# Patient Record
Sex: MALE | Race: WHITE | NOT HISPANIC OR LATINO | Employment: FULL TIME | URBAN - METROPOLITAN AREA
[De-identification: names, ages, dates, MRNs, and addresses within clinical notes are randomized per-mention and may not be internally consistent; named-entity substitution may affect disease eponyms.]

---

## 2024-06-12 ENCOUNTER — TELEPHONE (OUTPATIENT)
Dept: FAMILY MEDICINE | Facility: CLINIC | Age: 54
End: 2024-06-12
Payer: COMMERCIAL

## 2024-06-12 NOTE — TELEPHONE ENCOUNTER
----- Message from Larissa Guzman sent at 6/12/2024  9:56 AM CDT -----  Regarding: appt  Type:  Sooner Apoointment Request      Name of Caller:pt    When is the first available appointment?dept booked     Symptoms:annual/ est care      Best Call Back Number:[phone]      Additional Information: pt is looking to get schedule.  His wife is a pt in would like to under the same care. please call to discuss.

## 2024-06-19 ENCOUNTER — OFFICE VISIT (OUTPATIENT)
Dept: FAMILY MEDICINE | Facility: CLINIC | Age: 54
End: 2024-06-19
Payer: COMMERCIAL

## 2024-06-19 ENCOUNTER — PATIENT MESSAGE (OUTPATIENT)
Dept: GASTROENTEROLOGY | Facility: CLINIC | Age: 54
End: 2024-06-19
Payer: COMMERCIAL

## 2024-06-19 VITALS
BODY MASS INDEX: 33.01 KG/M2 | WEIGHT: 198.13 LBS | DIASTOLIC BLOOD PRESSURE: 82 MMHG | OXYGEN SATURATION: 97 % | HEIGHT: 65 IN | HEART RATE: 78 BPM | SYSTOLIC BLOOD PRESSURE: 135 MMHG

## 2024-06-19 DIAGNOSIS — Z12.5 SPECIAL SCREENING FOR MALIGNANT NEOPLASM OF PROSTATE: ICD-10-CM

## 2024-06-19 DIAGNOSIS — Z11.3 SCREEN FOR STD (SEXUALLY TRANSMITTED DISEASE): ICD-10-CM

## 2024-06-19 DIAGNOSIS — Z13.1 DIABETES MELLITUS SCREENING: ICD-10-CM

## 2024-06-19 DIAGNOSIS — R10.30 LOWER ABDOMINAL PAIN: ICD-10-CM

## 2024-06-19 DIAGNOSIS — Z00.00 PREVENTATIVE HEALTH CARE: ICD-10-CM

## 2024-06-19 DIAGNOSIS — Z13.220 ENCOUNTER FOR SCREENING FOR LIPID DISORDER: ICD-10-CM

## 2024-06-19 DIAGNOSIS — Z12.12 SCREENING FOR MALIGNANT NEOPLASM OF THE RECTUM: ICD-10-CM

## 2024-06-19 DIAGNOSIS — Z11.59 ENCOUNTER FOR HEPATITIS C SCREENING TEST FOR LOW RISK PATIENT: ICD-10-CM

## 2024-06-19 DIAGNOSIS — N52.01 ERECTILE DYSFUNCTION DUE TO ARTERIAL INSUFFICIENCY: ICD-10-CM

## 2024-06-19 DIAGNOSIS — I10 ESSENTIAL HYPERTENSION, BENIGN: Primary | ICD-10-CM

## 2024-06-19 PROCEDURE — 3075F SYST BP GE 130 - 139MM HG: CPT | Mod: CPTII,S$GLB,, | Performed by: INTERNAL MEDICINE

## 2024-06-19 PROCEDURE — 1160F RVW MEDS BY RX/DR IN RCRD: CPT | Mod: CPTII,S$GLB,, | Performed by: INTERNAL MEDICINE

## 2024-06-19 PROCEDURE — 3079F DIAST BP 80-89 MM HG: CPT | Mod: CPTII,S$GLB,, | Performed by: INTERNAL MEDICINE

## 2024-06-19 PROCEDURE — 1159F MED LIST DOCD IN RCRD: CPT | Mod: CPTII,S$GLB,, | Performed by: INTERNAL MEDICINE

## 2024-06-19 PROCEDURE — 99204 OFFICE O/P NEW MOD 45 MIN: CPT | Mod: S$GLB,,, | Performed by: INTERNAL MEDICINE

## 2024-06-19 PROCEDURE — 3008F BODY MASS INDEX DOCD: CPT | Mod: CPTII,S$GLB,, | Performed by: INTERNAL MEDICINE

## 2024-06-19 NOTE — PROGRESS NOTES
Subjective:       Patient ID: Shaun Peetrson is a 53 y.o. male.    Chief Complaint: Abdominal Pain (Patient is here for abdomin pain and having trouble urinating. )      /is a new patient who presents today to establish care and for management of the chronic problems  and preventive medicine      Abdominal Pain  Pertinent negatives include no fever. His past medical history is significant for abdominal surgery.   Abdominal Cramping  This is a chronic problem. The current episode started more than 1 year ago. The onset quality is gradual. The problem occurs intermittently. The problem has been waxing and waning. The pain is located in the periumbilical region. The pain is at a severity of 3/10. The pain is mild. The quality of the pain is cramping. The abdominal pain does not radiate. Pertinent negatives include no fever. Associated symptoms comments: Urge fecal incont. Nothing aggravates the pain. He has tried nothing for the symptoms. Prior diagnostic workup includes surgery. His past medical history is significant for abdominal surgery.     Review of Systems   Constitutional:  Negative for activity change, appetite change and fever.   Respiratory: Negative.     Cardiovascular: Negative.    Gastrointestinal:  Positive for abdominal pain and fecal incontinence.   Musculoskeletal: Negative.          Objective:      Physical Exam  Vitals and nursing note reviewed.   Constitutional:       Appearance: Normal appearance. He is obese.   Cardiovascular:      Rate and Rhythm: Normal rate and regular rhythm.      Pulses: Normal pulses.      Heart sounds: Normal heart sounds. No murmur heard.     No friction rub. No gallop.   Pulmonary:      Effort: Pulmonary effort is normal.      Breath sounds: Normal breath sounds. No wheezing.   Abdominal:      General: There is no distension.      Palpations: Abdomen is soft.      Tenderness: There is no abdominal tenderness.   Skin:     General: Skin is warm and dry.    Neurological:      Mental Status: He is alert.   Psychiatric:         Mood and Affect: Mood normal.         Assessment:       1. Essential hypertension, benign  Overview:  Slightly elevated today    Assessment & Plan:  Screen for hyperlipidemia and diabetes  We discussed diet modification and weight loss  We are going to monitor his blood pressure closely    Orders:  -     Microalbumin/Creatinine Ratio, Urine; Future; Expected date: 06/19/2024  -     POCT urine dipstick without microscope  -     Comprehensive Metabolic Panel; Future; Expected date: 06/19/2024    2. Encounter for screening for lipid disorder  -     Lipid Panel; Future; Expected date: 06/19/2024    3. Encounter for hepatitis C screening test for low risk patient  -     Hepatitis C Antibody; Future; Expected date: 06/19/2024    4. Screen for STD (sexually transmitted disease)  -     HIV 1/2 Ag/Ab (4th Gen); Future; Expected date: 12/19/2024    5. Special screening for malignant neoplasm of prostate  -     Prostate Specific Antigen, Diagnostic; Future; Expected date: 06/19/2024    6. Diabetes mellitus screening  -     Hemoglobin A1C; Future; Expected date: 06/19/2024    7. Erectile dysfunction due to arterial insufficiency  Overview:  ED    Assessment & Plan:  Check his T level    Orders:  -     Testosterone; Future; Expected date: 06/19/2024    8. Screening for malignant neoplasm of the rectum  -     Case Request Endoscopy: COLONOSCOPY    9. Lower abdominal pain  Overview:  Status post repair of an periumbilical hernia 3 times  Patient had a mesh last 2 time    Assessment & Plan:  I am going to check his liver function  We are going to monitor his abdominal symptoms close  I am also going to refer him to GI because of his fecal incontinence and tenesmus      10. Preventative health care  Overview:  See below    Assessment & Plan:  Screening for hep C and H  Refer to [facility] for a colonoscope             Plan:       1. Essential hypertension,  benign  Overview:  Slightly elevated today    Assessment & Plan:  Screen for hyperlipidemia and diabetes  We discussed diet modification and weight loss  We are going to monitor his blood pressure closely    Orders:  -     Microalbumin/Creatinine Ratio, Urine; Future; Expected date: 06/19/2024  -     POCT urine dipstick without microscope  -     Comprehensive Metabolic Panel; Future; Expected date: 06/19/2024    2. Encounter for screening for lipid disorder  -     Lipid Panel; Future; Expected date: 06/19/2024    3. Encounter for hepatitis C screening test for low risk patient  -     Hepatitis C Antibody; Future; Expected date: 06/19/2024    4. Screen for STD (sexually transmitted disease)  -     HIV 1/2 Ag/Ab (4th Gen); Future; Expected date: 12/19/2024    5. Special screening for malignant neoplasm of prostate  -     Prostate Specific Antigen, Diagnostic; Future; Expected date: 06/19/2024    6. Diabetes mellitus screening  -     Hemoglobin A1C; Future; Expected date: 06/19/2024    7. Erectile dysfunction due to arterial insufficiency  Overview:  ED    Assessment & Plan:  Check his T level    Orders:  -     Testosterone; Future; Expected date: 06/19/2024    8. Screening for malignant neoplasm of the rectum  -     Case Request Endoscopy: COLONOSCOPY    9. Lower abdominal pain  Overview:  Status post repair of an periumbilical hernia 3 times  Patient had a mesh last 2 time    Assessment & Plan:  I am going to check his liver function  We are going to monitor his abdominal symptoms close  I am also going to refer him to GI because of his fecal incontinence and tenesmus      10. Preventative health care  Overview:  See below    Assessment & Plan:  Screening for hep C and H  Refer to [facility] for a colonoscope

## 2024-06-19 NOTE — PATIENT INSTRUCTIONS
Labs at [facility] on [address]    See you back in 2 weeks    They'll call you for the scope at [place]

## 2024-06-19 NOTE — ASSESSMENT & PLAN NOTE
I am going to check his liver function  We are going to monitor his abdominal symptoms close  I am also going to refer him to GI because of his fecal incontinence and tenesmus

## 2024-06-19 NOTE — ASSESSMENT & PLAN NOTE
Screen for hyperlipidemia and diabetes  We discussed diet modification and weight loss  We are going to monitor his blood pressure closely

## 2024-06-29 ENCOUNTER — LAB VISIT (OUTPATIENT)
Dept: LAB | Facility: HOSPITAL | Age: 54
End: 2024-06-29
Attending: INTERNAL MEDICINE
Payer: COMMERCIAL

## 2024-06-29 DIAGNOSIS — N52.01 ERECTILE DYSFUNCTION DUE TO ARTERIAL INSUFFICIENCY: ICD-10-CM

## 2024-06-29 DIAGNOSIS — Z13.220 ENCOUNTER FOR SCREENING FOR LIPID DISORDER: ICD-10-CM

## 2024-06-29 DIAGNOSIS — Z11.3 SCREEN FOR STD (SEXUALLY TRANSMITTED DISEASE): ICD-10-CM

## 2024-06-29 DIAGNOSIS — I10 ESSENTIAL HYPERTENSION, BENIGN: ICD-10-CM

## 2024-06-29 DIAGNOSIS — Z11.59 ENCOUNTER FOR HEPATITIS C SCREENING TEST FOR LOW RISK PATIENT: ICD-10-CM

## 2024-06-29 DIAGNOSIS — Z12.5 SPECIAL SCREENING FOR MALIGNANT NEOPLASM OF PROSTATE: ICD-10-CM

## 2024-06-29 DIAGNOSIS — Z13.1 DIABETES MELLITUS SCREENING: ICD-10-CM

## 2024-06-29 LAB
ALBUMIN SERPL BCP-MCNC: 3.9 G/DL (ref 3.5–5.2)
ALP SERPL-CCNC: 67 U/L (ref 55–135)
ALT SERPL W/O P-5'-P-CCNC: 37 U/L (ref 10–44)
ANION GAP SERPL CALC-SCNC: 11 MMOL/L (ref 8–16)
AST SERPL-CCNC: 21 U/L (ref 10–40)
BILIRUB SERPL-MCNC: 0.6 MG/DL (ref 0.1–1)
BUN SERPL-MCNC: 20 MG/DL (ref 6–20)
CALCIUM SERPL-MCNC: 9.5 MG/DL (ref 8.7–10.5)
CHLORIDE SERPL-SCNC: 106 MMOL/L (ref 95–110)
CHOLEST SERPL-MCNC: 221 MG/DL (ref 120–199)
CHOLEST/HDLC SERPL: 7.9 {RATIO} (ref 2–5)
CO2 SERPL-SCNC: 22 MMOL/L (ref 23–29)
COMPLEXED PSA SERPL-MCNC: 0.69 NG/ML (ref 0–4)
CREAT SERPL-MCNC: 1 MG/DL (ref 0.5–1.4)
EST. GFR  (NO RACE VARIABLE): >60 ML/MIN/1.73 M^2
ESTIMATED AVG GLUCOSE: 114 MG/DL (ref 68–131)
GLUCOSE SERPL-MCNC: 121 MG/DL (ref 70–110)
HBA1C MFR BLD: 5.6 % (ref 4–5.6)
HCV AB SERPL QL IA: NORMAL
HDLC SERPL-MCNC: 28 MG/DL (ref 40–75)
HDLC SERPL: 12.7 % (ref 20–50)
HIV 1+2 AB+HIV1 P24 AG SERPL QL IA: NORMAL
LDLC SERPL CALC-MCNC: 154.2 MG/DL (ref 63–159)
NONHDLC SERPL-MCNC: 193 MG/DL
POTASSIUM SERPL-SCNC: 4.3 MMOL/L (ref 3.5–5.1)
PROT SERPL-MCNC: 7.4 G/DL (ref 6–8.4)
SODIUM SERPL-SCNC: 139 MMOL/L (ref 136–145)
TESTOST SERPL-MCNC: 486 NG/DL (ref 304–1227)
TRIGL SERPL-MCNC: 194 MG/DL (ref 30–150)

## 2024-06-29 PROCEDURE — 36415 COLL VENOUS BLD VENIPUNCTURE: CPT | Performed by: INTERNAL MEDICINE

## 2024-06-29 PROCEDURE — 84403 ASSAY OF TOTAL TESTOSTERONE: CPT | Performed by: INTERNAL MEDICINE

## 2024-06-29 PROCEDURE — 87389 HIV-1 AG W/HIV-1&-2 AB AG IA: CPT | Performed by: INTERNAL MEDICINE

## 2024-06-29 PROCEDURE — 86803 HEPATITIS C AB TEST: CPT | Performed by: INTERNAL MEDICINE

## 2024-06-29 PROCEDURE — 80061 LIPID PANEL: CPT | Performed by: INTERNAL MEDICINE

## 2024-06-29 PROCEDURE — 83036 HEMOGLOBIN GLYCOSYLATED A1C: CPT | Performed by: INTERNAL MEDICINE

## 2024-06-29 PROCEDURE — 84153 ASSAY OF PSA TOTAL: CPT | Performed by: INTERNAL MEDICINE

## 2024-06-29 PROCEDURE — 80053 COMPREHEN METABOLIC PANEL: CPT | Performed by: INTERNAL MEDICINE

## 2024-07-01 ENCOUNTER — LAB VISIT (OUTPATIENT)
Dept: LAB | Facility: CLINIC | Age: 54
End: 2024-07-01
Payer: COMMERCIAL

## 2024-07-01 DIAGNOSIS — Z13.1 DIABETES MELLITUS SCREENING: ICD-10-CM

## 2024-07-01 DIAGNOSIS — I10 ESSENTIAL HYPERTENSION, BENIGN: ICD-10-CM

## 2024-07-01 DIAGNOSIS — Z12.5 SPECIAL SCREENING FOR MALIGNANT NEOPLASM OF PROSTATE: ICD-10-CM

## 2024-07-01 DIAGNOSIS — Z11.59 ENCOUNTER FOR HEPATITIS C SCREENING TEST FOR LOW RISK PATIENT: ICD-10-CM

## 2024-07-01 DIAGNOSIS — Z13.220 ENCOUNTER FOR SCREENING FOR LIPID DISORDER: ICD-10-CM

## 2024-07-01 DIAGNOSIS — Z11.3 SCREEN FOR STD (SEXUALLY TRANSMITTED DISEASE): ICD-10-CM

## 2024-07-01 DIAGNOSIS — N52.01 ERECTILE DYSFUNCTION DUE TO ARTERIAL INSUFFICIENCY: ICD-10-CM

## 2024-07-01 PROCEDURE — 82043 UR ALBUMIN QUANTITATIVE: CPT | Performed by: INTERNAL MEDICINE

## 2024-07-01 PROCEDURE — 82570 ASSAY OF URINE CREATININE: CPT | Performed by: INTERNAL MEDICINE

## 2024-07-02 LAB
ALBUMIN/CREAT UR: 3.6 UG/MG (ref 0–30)
CREAT UR-MCNC: 224 MG/DL (ref 23–375)
MICROALBUMIN UR DL<=1MG/L-MCNC: 8 UG/ML

## 2024-07-11 ENCOUNTER — OFFICE VISIT (OUTPATIENT)
Dept: FAMILY MEDICINE | Facility: CLINIC | Age: 54
End: 2024-07-11
Payer: COMMERCIAL

## 2024-07-11 VITALS
HEART RATE: 77 BPM | WEIGHT: 197.88 LBS | SYSTOLIC BLOOD PRESSURE: 102 MMHG | BODY MASS INDEX: 32.97 KG/M2 | HEIGHT: 65 IN | OXYGEN SATURATION: 96 % | DIASTOLIC BLOOD PRESSURE: 60 MMHG

## 2024-07-11 DIAGNOSIS — D17.1 LIPOMA OF TORSO: ICD-10-CM

## 2024-07-11 DIAGNOSIS — R68.82 LIBIDO, DECREASED: ICD-10-CM

## 2024-07-11 DIAGNOSIS — E78.2 MIXED HYPERLIPIDEMIA: Primary | ICD-10-CM

## 2024-07-11 DIAGNOSIS — Z00.00 ANNUAL PHYSICAL EXAM: ICD-10-CM

## 2024-07-11 PROCEDURE — 1160F RVW MEDS BY RX/DR IN RCRD: CPT | Mod: CPTII,S$GLB,, | Performed by: INTERNAL MEDICINE

## 2024-07-11 PROCEDURE — 99396 PREV VISIT EST AGE 40-64: CPT | Mod: S$GLB,,, | Performed by: INTERNAL MEDICINE

## 2024-07-11 PROCEDURE — 3061F NEG MICROALBUMINURIA REV: CPT | Mod: CPTII,S$GLB,, | Performed by: INTERNAL MEDICINE

## 2024-07-11 PROCEDURE — 99213 OFFICE O/P EST LOW 20 MIN: CPT | Mod: 25,S$GLB,, | Performed by: INTERNAL MEDICINE

## 2024-07-11 PROCEDURE — 1159F MED LIST DOCD IN RCRD: CPT | Mod: CPTII,S$GLB,, | Performed by: INTERNAL MEDICINE

## 2024-07-11 PROCEDURE — 3066F NEPHROPATHY DOC TX: CPT | Mod: CPTII,S$GLB,, | Performed by: INTERNAL MEDICINE

## 2024-07-11 PROCEDURE — 3044F HG A1C LEVEL LT 7.0%: CPT | Mod: CPTII,S$GLB,, | Performed by: INTERNAL MEDICINE

## 2024-07-11 PROCEDURE — 3074F SYST BP LT 130 MM HG: CPT | Mod: CPTII,S$GLB,, | Performed by: INTERNAL MEDICINE

## 2024-07-11 PROCEDURE — 3008F BODY MASS INDEX DOCD: CPT | Mod: CPTII,S$GLB,, | Performed by: INTERNAL MEDICINE

## 2024-07-11 PROCEDURE — 3078F DIAST BP <80 MM HG: CPT | Mod: CPTII,S$GLB,, | Performed by: INTERNAL MEDICINE

## 2024-07-11 RX ORDER — SILDENAFIL 100 MG/1
100 TABLET, FILM COATED ORAL DAILY PRN
Qty: 10 TABLET | Refills: 2 | Status: SHIPPED | OUTPATIENT
Start: 2024-07-11 | End: 2024-10-09

## 2024-07-11 NOTE — PROGRESS NOTES
"Subjective:       Patient ID: Shaun Peterson is a 53 y.o. male.    Chief Complaint: Follow-up, Annual Exam, and Lump      Patient presents for a wellness visit  No new c/o today  Please refer to initial intake notes for screening/preventive measures  All histories and immunization schedule reviewed with patient    Plus skin mass, high chol, BS    Follow-up  This is a new problem. The current episode started more than 1 year ago. The problem has been unchanged. Pertinent negatives include no fever. Associated symptoms comments: mass of back. Nothing aggravates the symptoms. He has tried nothing for the symptoms.     Review of Systems   Constitutional:  Negative for activity change, appetite change and fever.   Respiratory: Negative.     Cardiovascular: Negative.    Gastrointestinal: Negative.    Genitourinary:         Dec libido   Musculoskeletal: Negative.         Mass of L mid back         Objective:      Physical Exam  Vitals and nursing note reviewed.   Constitutional:       Appearance: Normal appearance.   Cardiovascular:      Rate and Rhythm: Normal rate and regular rhythm.      Pulses: Normal pulses.      Heart sounds: Normal heart sounds. No murmur heard.     No friction rub. No gallop.   Pulmonary:      Effort: Pulmonary effort is normal.      Breath sounds: Normal breath sounds. No wheezing.   Musculoskeletal:      Comments: A lipoma like mass of L mid back , about 8 by 6" in diam   Skin:     General: Skin is warm and dry.   Neurological:      Mental Status: He is alert.   Psychiatric:         Mood and Affect: Mood normal.         Assessment:       1. Mixed hyperlipidemia  Overview:  With inc TC, dec HDL    Assessment & Plan:  Low fat diet d/w pt  Recheck in 3 M    Orders:  -     Lipid Panel; Future; Expected date: 07/11/2024    2. Annual physical exam  Overview:  Annual visit    Assessment & Plan:  Patient presents for a wellness visit  No new c/o today  Please refer to initial intake notes for " screening/preventive measures  All histories and immunization schedule reviewed with patient  He has an parth for c-scope in Sept    Plus has a lump on his L mid back      3. Lipoma of torso  Overview:  Of l mid back    Assessment & Plan:  T/c surg ref      Other orders  -     sildenafiL (VIAGRA) 100 MG tablet; Take 1 tablet (100 mg total) by mouth daily as needed for Erectile Dysfunction.  Dispense: 10 tablet; Refill: 2           Plan:       1. Mixed hyperlipidemia  Overview:  With inc TC, dec HDL    Assessment & Plan:  Low fat diet d/w pt  Recheck in 3 M    Orders:  -     Lipid Panel; Future; Expected date: 07/11/2024    2. Annual physical exam  Overview:  Annual visit    Assessment & Plan:  Patient presents for a wellness visit  No new c/o today  Please refer to initial intake notes for screening/preventive measures  All histories and immunization schedule reviewed with patient  He has an parth for c-scope in Sept    Plus has a lump on his L mid back      3. Lipoma of torso  Overview:  Of l mid back    Assessment & Plan:  T/c surg ref      Other orders  -     sildenafiL (VIAGRA) 100 MG tablet; Take 1 tablet (100 mg total) by mouth daily as needed for Erectile Dysfunction.  Dispense: 10 tablet; Refill: 2

## 2024-07-11 NOTE — ASSESSMENT & PLAN NOTE
Patient presents for a wellness visit  No new c/o today  Please refer to initial intake notes for screening/preventive measures  All histories and immunization schedule reviewed with patient  He has an parth for c-scope in Sept    Plus has a lump on his L mid back

## 2024-07-27 ENCOUNTER — PATIENT MESSAGE (OUTPATIENT)
Dept: ADMINISTRATIVE | Facility: HOSPITAL | Age: 54
End: 2024-07-27
Payer: COMMERCIAL

## 2024-09-19 ENCOUNTER — PATIENT MESSAGE (OUTPATIENT)
Dept: GASTROENTEROLOGY | Facility: CLINIC | Age: 54
End: 2024-09-19
Payer: COMMERCIAL

## 2024-10-09 ENCOUNTER — PATIENT MESSAGE (OUTPATIENT)
Dept: ADMINISTRATIVE | Facility: HOSPITAL | Age: 54
End: 2024-10-09
Payer: COMMERCIAL

## 2024-10-14 PROBLEM — Z00.00 ANNUAL PHYSICAL EXAM: Status: RESOLVED | Noted: 2024-06-19 | Resolved: 2024-10-14

## 2025-04-25 ENCOUNTER — PATIENT MESSAGE (OUTPATIENT)
Dept: ADMINISTRATIVE | Facility: HOSPITAL | Age: 55
End: 2025-04-25
Payer: COMMERCIAL